# Patient Record
Sex: MALE | Race: WHITE | NOT HISPANIC OR LATINO | Employment: FULL TIME | URBAN - METROPOLITAN AREA
[De-identification: names, ages, dates, MRNs, and addresses within clinical notes are randomized per-mention and may not be internally consistent; named-entity substitution may affect disease eponyms.]

---

## 2017-04-05 ENCOUNTER — TRANSCRIBE ORDERS (OUTPATIENT)
Dept: PHYSICAL THERAPY | Facility: CLINIC | Age: 32
End: 2017-04-05

## 2017-04-05 DIAGNOSIS — Z98.890 H/O LUMBAR DISCECTOMY: Primary | ICD-10-CM

## 2017-04-06 ENCOUNTER — TREATMENT (OUTPATIENT)
Dept: PHYSICAL THERAPY | Facility: CLINIC | Age: 32
End: 2017-04-06

## 2017-04-06 DIAGNOSIS — M54.41 ACUTE RIGHT-SIDED LOW BACK PAIN WITH RIGHT-SIDED SCIATICA: Primary | ICD-10-CM

## 2017-04-06 DIAGNOSIS — Z98.890 H/O LUMBAR DISCECTOMY: ICD-10-CM

## 2017-04-06 PROCEDURE — 97530 THERAPEUTIC ACTIVITIES: CPT | Performed by: PHYSICAL THERAPIST

## 2017-04-06 PROCEDURE — 97001 PR PHYS THERAPY EVALUATION: CPT | Performed by: PHYSICAL THERAPIST

## 2017-04-06 PROCEDURE — 97110 THERAPEUTIC EXERCISES: CPT | Performed by: PHYSICAL THERAPIST

## 2017-04-06 NOTE — PROGRESS NOTES
"Physical Therapy Initial Evaluation and Plan of Care    TIME IN 1501 TIME OUT 1602  Patient: Drew Long   : 1985  Diagnosis/ICD-10 Code:  No primary diagnosis found.  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  for [employer].  Did PT x 1 month without significant improvement.  States he was released back to full duty.   Worked through the pain until 2016 when pain had worsened to unbearable level.   Went to ER - had xray and CT done. Abnormal results.  Referred to spine specialist (Mariah?). MRI ordered.  2 disc herniations with pinched nerve found at L4-5 and L5-S1.  Difficulty negotiating with work comp and getting approval.  Saw Dr. Fields end of 2016 who stated the injury was work-related and surgery was necessary.  Surgery approved.   Delay in PT approval by work comp.   Off work since Dec. 5, 2016.  Denies prior injury or low back pain before work comp injury.  Sleep not interrupted by pain.      Quality of life: excellent    Pain  Current pain ratin  At best pain ratin  At worst pain ratin  Location: Central to right lower lumbar pain, posterolateral hip/thigh pain (approx half-way down); rarely radicular pain to (R) foot  Quality: sharp and dull ache (sharp when it \"catches\")  Relieving factors: medications, heat, relaxation, rest and change in position (occasionally takes a hydrocodone; hot water/bath; lying supine)  Exacerbated by: standing/walking > 1 hour, coming up from bending forward (catch, radicular pain),  getting out of car (catch, pain)  Progression: improved    Social Support  Lives in: one-story house (with basement which he does not use often (father lives in basement))  Lives with: young children and spouse    Hand dominance: right    Diagnostic " "Tests  Abnormal MRI: None since surgery.    Treatments  Previous treatment: physical therapy (for approx. 1 month)  Current treatment: physical therapy  Patient Goals  Patient goals for therapy: decreased pain, increased motion and return to work  Patient goal: \"get to where my back is not as stiff; get ready to get back to work\"           Objective     Tenderness     Additional Tenderness Details  Neg. TTP (B) lumbar and gluteal regions.    Neurological Testing   Sensation     Lumbar   Left   Intact: light touch    Right   Intact: light touch    Additional Neurological Details  Weakness of (R) LE when heel walking (inability to maintain active dorsiflexion with weightbearing on (R) heel only).    Active Range of Motion     Lumbar   Flexion: 25 ((R) LBP) degrees with pain  Extension: 75 degrees   Left lateral flexion: 50 (\"tight, stiff\") degrees   Right lateral flexion: 75 (\"tight, stiff\") degrees   Left rotation: 50 (\"tight, stiff\") degrees   Right rotation: 50 (\"tight, stiff\") degrees     Strength/Myotome Testing     Lumbar     Right   Heel walk: abnormal    Left Hip   Planes of Motion   Flexion: 4+    Right Hip   Planes of Motion   Flexion: 4-    Left Knee   Flexion: 5  Extension: 5    Right Knee   Flexion: 3+  Extension: 4- (with initial sharp pain (R) low back)    Left Ankle/Foot   Dorsiflexion: 5    Right Ankle/Foot   Dorsiflexion: 3-    Additional Strength Details  (R) heel walking positive for significant weakness and inability to maintain active dorsiflexion throughout activity.    Tests     Additional Tests Details  Positive (R) seated SLR.         Assessment & Plan     Assessment  Impairments: abnormal gait, abnormal or restricted ROM, activity intolerance, impaired physical strength, lacks appropriate home exercise program and pain with function  Other impairment: Inability to perform full duty work tasks  Assessment details: STGs: to be met in 4 weeks  1. Patient will be independent with initial HEP  2. " "Patient will report improved \"worst\" s/s to 4-5/10  3. Patient will demonstrate ability to heel walk  4. Patient will demonstrate lumbar AROM WFL all planes, painfree  5. Patient will consistently report no radicular s/s beyond (R) hip region  6. Patient will be able to consistently come to stand from sitting/bending forward/getting out of car without catching and/or radicular pain    LTGs: to be met in 8 weeks  1. Patient will be independent with progressed strength and stabilization HEP  2. Patient will demonstrate improved (R) LE strength to 5-/5  3. Patient will report ability to stand/walk for 2-3 hour periods without pain  4. Patient will report improved s/s to 2/10 worst  5. Patient will demonstrate correct body mechanics independently with lifting activities  6. Patient will demonstrate ability to safely lift, carry, push/pull, crawl, and climb to demonstrate readiness to RTW without restrictions        Goals  \"get to where my back is not as stiff; get ready to get back to work\"    Plan  Plan details: Patient RTMD (Dr. Fields) 05/01/17.        Manual Therapy:         mins  75350;  Therapeutic Exercise:    15     mins  95485;     Neuromuscular Beatrice:        mins  75999;    Therapeutic Activity:     24     mins  23656;     Gait Training:           mins  84262;     Ultrasound:          mins  19393;    Electrical Stimulation:         mins  15201 ( );  Dry Needling          mins self-pay    Timed Treatment:   39   mins   Total Treatment:     61   mins    PT SIGNATURE: Gita Hawkins PT, DPT   DATE TREATMENT INITIATED: 4/6/2017    Initial Certification  Certification Period: 7/5/2017  I certify that the therapy services are furnished while this patient is under my care.  The services outlined above are required by this patient, and will be reviewed every 90 days.     PHYSICIAN: Gordon Fields MD      DATE:     Please sign and return via fax to [phone].. Thank you, [facility] " Therapy.

## 2017-04-12 ENCOUNTER — TREATMENT (OUTPATIENT)
Dept: PHYSICAL THERAPY | Facility: CLINIC | Age: 32
End: 2017-04-12

## 2017-04-12 DIAGNOSIS — M54.41 ACUTE RIGHT-SIDED LOW BACK PAIN WITH RIGHT-SIDED SCIATICA: Primary | ICD-10-CM

## 2017-04-12 DIAGNOSIS — Z98.890 H/O LUMBAR DISCECTOMY: ICD-10-CM

## 2017-04-12 PROCEDURE — 97530 THERAPEUTIC ACTIVITIES: CPT | Performed by: PHYSICAL THERAPIST

## 2017-04-12 PROCEDURE — 97110 THERAPEUTIC EXERCISES: CPT | Performed by: PHYSICAL THERAPIST

## 2017-04-12 NOTE — PROGRESS NOTES
"Physical Therapy Daily Progress Note    Time In 1104  Time Out 1146    Drew Long reports: \"My back is feeling all right.  I'm not having any pain right now.  I'm doing okay with the stretches.\"    Subjective Evaluation    Pain  Current pain ratin           Objective   See Exercise, Manual, and Modality Logs for complete treatment.   *Added FW and RETRO treadmill ambulation.  *Added supine 90/90 hamstring stretch with belt    Assessment & Plan     Assessment  Assessment details: Patient is able to initiate treadmill ambulation (forward and retro) as well as hamstrings stretches without significant difficulty this date, reporting only \"tightness\" with hamstring stretches.  He is cued for upright posture during ambulation on treadmill with fair return demo.  Moderate to significant deep abdominal weakness is evidenced this date during initiation of lumbar stabilization activities (with emphasis on transversus abdominus muscle) by minimal activation as well as quickness to fatigue.  Issued updated HEP printout to facilitate compliance and patient and his wife both have all questions answered to their satisfaction this date.        Progress per Plan of Care         Manual Therapy:         mins  38637;  Therapeutic Exercise:    26     mins  43088;     Neuromuscular Beatrice:        mins  45829;    Therapeutic Activity:     16     mins  52401;     Gait Training:           mins  11212;     Ultrasound:          mins  53657;    Electrical Stimulation:         mins  78051 ( );  Dry Needling          mins self-pay    Timed Treatment:   42   mins   Total Treatment:     42   mins    Gita Hawkins PT, DPT  Physical Therapist  [place] License # [id number]    "

## 2017-04-13 ENCOUNTER — TREATMENT (OUTPATIENT)
Dept: PHYSICAL THERAPY | Facility: CLINIC | Age: 32
End: 2017-04-13

## 2017-04-13 DIAGNOSIS — Z98.890 H/O LUMBAR DISCECTOMY: ICD-10-CM

## 2017-04-13 DIAGNOSIS — M54.41 ACUTE RIGHT-SIDED LOW BACK PAIN WITH RIGHT-SIDED SCIATICA: Primary | ICD-10-CM

## 2017-04-13 PROCEDURE — 97530 THERAPEUTIC ACTIVITIES: CPT | Performed by: PHYSICAL THERAPIST

## 2017-04-13 PROCEDURE — 97110 THERAPEUTIC EXERCISES: CPT | Performed by: PHYSICAL THERAPIST

## 2017-04-18 ENCOUNTER — TREATMENT (OUTPATIENT)
Dept: PHYSICAL THERAPY | Facility: CLINIC | Age: 32
End: 2017-04-18

## 2017-04-18 DIAGNOSIS — Z98.890 H/O LUMBAR DISCECTOMY: ICD-10-CM

## 2017-04-18 DIAGNOSIS — M54.41 ACUTE RIGHT-SIDED LOW BACK PAIN WITH RIGHT-SIDED SCIATICA: Primary | ICD-10-CM

## 2017-04-18 PROCEDURE — 97530 THERAPEUTIC ACTIVITIES: CPT | Performed by: PHYSICAL THERAPIST

## 2017-04-18 PROCEDURE — 97110 THERAPEUTIC EXERCISES: CPT | Performed by: PHYSICAL THERAPIST

## 2017-04-18 NOTE — PROGRESS NOTES
"Physical Therapy Daily Progress Note    Time In 0859  Time Out 0942    Drew Long reports: \"My back feels pretty good today.  I was a little sore after last therapy session but nothing too unmanageable.  My wife and I started walking around the block in our neighborhood and it takes about 20 minutes.  My back is a little sore and tired by the end of that but after I sit down and rest a little bit it feels okay.\"    Subjective     Objective   See Exercise, Manual, and Modality Logs for complete treatment.   *Increased time spent performing retro treadmill  *Added SLR (B)  *Added wall slides with abdominal training    Assessment & Plan     Assessment  Assessment details: Discussed with patient the importance of strength with SLR activity as it is a good overall predictor of LE strength for functional activities and mobility.  He experiences quickness to fatigue (R) LE during closed chain wall slides with abdominal stabilization.  Encouraged patient to work to the point of fatigue but not beyond the point where he is able to utilize each LE equally to perform the activity.  Patient verbalized understanding.        Progress strengthening /stabilization /functional activity - bridging and/or step activity.         Manual Therapy:         mins  18427;  Therapeutic Exercise:    29     mins  45555;     Neuromuscular Beatrice:        mins  71863;    Therapeutic Activity:     14     mins  19556;     Gait Training:           mins  17715;     Ultrasound:          mins  10251;    Electrical Stimulation:         mins  56510 ( );  Dry Needling          mins self-pay    Timed Treatment:   43   mins   Total Treatment:     43   mins    Gita Hawkins PT, DPT  Physical Therapist  [place] License # [id number]    "

## 2017-04-20 ENCOUNTER — TREATMENT (OUTPATIENT)
Dept: PHYSICAL THERAPY | Facility: CLINIC | Age: 32
End: 2017-04-20

## 2017-04-20 DIAGNOSIS — Z98.890 H/O LUMBAR DISCECTOMY: ICD-10-CM

## 2017-04-20 DIAGNOSIS — M54.41 ACUTE RIGHT-SIDED LOW BACK PAIN WITH RIGHT-SIDED SCIATICA: Primary | ICD-10-CM

## 2017-04-20 PROCEDURE — 97530 THERAPEUTIC ACTIVITIES: CPT | Performed by: PHYSICAL THERAPIST

## 2017-04-20 PROCEDURE — 97110 THERAPEUTIC EXERCISES: CPT | Performed by: PHYSICAL THERAPIST

## 2017-04-20 NOTE — PROGRESS NOTES
Physical Therapy Daily Progress Note    Time In 0900  Time Out 0941    Drew Long reports: Patient responds that his back feels pretty good today, things are going well, and he was not excessively sore after last PT session.    Subjective     Objective     Active Range of Motion     Lumbar   Flexion: 35 (%; with tightness) degrees   Extension: 75 (%; some pain) degrees   Left lateral flexion: 75 (%) degrees   Right lateral flexion: 75 (%) degrees   Left rotation: 90 (%) degrees   Right rotation: 90 (%) degrees      See Exercise, Manual, and Modality Logs for complete treatment.   *Increased treadmill forward ambulation time  *Progressed abdominal stabilization activities to incorporate slow, controlled LE marches  *Increased SLR repetitions     Assessment & Plan     Assessment  Assessment details: Patient is able to progress forward treadmill ambulation time, increase SLR repetitions, and progress abdominal stabilization activity to include addition of alternating LE marches (slow, controlled) without c/o increased soreness. He demonstrates mildly improved lumbar flexion, left lateral flexion, and (B) rotation as assessed this date compared to initial evaluation.  Flexion plane is limited by lumbar tightness whereas extension is moderately painful.        Progress strengthening /stabilization /functional activity - attempt to slightly increase treadmill speed.         Manual Therapy:         mins  04451;  Therapeutic Exercise:    27     mins  48306;     Neuromuscular Beatrice:        mins  31887;    Therapeutic Activity:     14     mins  51359;     Gait Training:           mins  38567;     Ultrasound:          mins  22301;    Electrical Stimulation:         mins  72264 ( );  Dry Needling          mins self-pay    Timed Treatment:   41   mins   Total Treatment:     41   mins    Gita Hawkins PT, DPT  Physical Therapist  [place] License # [id number]

## 2017-04-24 ENCOUNTER — TREATMENT (OUTPATIENT)
Dept: PHYSICAL THERAPY | Facility: CLINIC | Age: 32
End: 2017-04-24

## 2017-04-24 DIAGNOSIS — Z98.890 H/O LUMBAR DISCECTOMY: ICD-10-CM

## 2017-04-24 DIAGNOSIS — M54.41 ACUTE RIGHT-SIDED LOW BACK PAIN WITH RIGHT-SIDED SCIATICA: Primary | ICD-10-CM

## 2017-04-24 PROCEDURE — 97530 THERAPEUTIC ACTIVITIES: CPT | Performed by: PHYSICAL THERAPIST

## 2017-04-24 PROCEDURE — 97110 THERAPEUTIC EXERCISES: CPT | Performed by: PHYSICAL THERAPIST

## 2017-04-24 NOTE — PROGRESS NOTES
"Physical Therapy Daily Progress Note    Time In 0859  Time Out 0939    Drew Long reports: \"My back is doing pretty well.  It's sore, but it is manageable. I'm sleeping okay -- my back isn't bothering me. I'm happy with how things are going so far.  I have been walking around the block and toward the end of that I start getting some pain that goes down my right leg.\"    Subjective     Objective   See Exercise, Manual, and Modality Logs for complete treatment.   *Increased retro ambulation time by 1 minute.  *Added bridging with A.H.    Assessment & Plan     Assessment  Assessment details: Patient expresses mild pain in central lower lumbar region with performance of bridging activity.  Cued patient to emphasize abdominal stabilization rather than height achieved with bridging and also to perform exercise within mostly painfree ROM arc and only to the extent that it is performed without radicular s/s.  He does fatigue quickly with abdominal stabilization activities as well as open and closed chain LE strengthening activities, proximal emphasis > distal emphasis.  He demonstrates no (R) foot drop or difficulty clearing (R) LE in 13 minutes of treadmill ambulation (7 forward, 6 retro).        Progress strengthening /stabilization /functional activity. Increase treadmill speed as patient is able.         Manual Therapy:         mins  18675;  Therapeutic Exercise:    27     mins  92096;     Neuromuscular Beatrice:        mins  08897;    Therapeutic Activity:     13     mins  33346;     Gait Training:           mins  05248;     Ultrasound:          mins  36970;    Electrical Stimulation:         mins  79432 ( );  Dry Needling          mins self-pay    Timed Treatment:   40   mins   Total Treatment:     40   mins    Gita Hawkins PT, DPT  Physical Therapist  [place] License # [id number]    "

## 2017-05-01 ENCOUNTER — OFFICE VISIT (OUTPATIENT)
Dept: PHYSICAL THERAPY | Facility: CLINIC | Age: 32
End: 2017-05-01

## 2017-05-01 DIAGNOSIS — Z98.890 H/O LUMBAR DISCECTOMY: Primary | ICD-10-CM

## 2017-05-01 DIAGNOSIS — M54.41 ACUTE RIGHT-SIDED LOW BACK PAIN WITH RIGHT-SIDED SCIATICA: ICD-10-CM

## 2017-05-01 PROCEDURE — 97530 THERAPEUTIC ACTIVITIES: CPT | Performed by: PHYSICAL THERAPIST

## 2017-05-01 PROCEDURE — 97110 THERAPEUTIC EXERCISES: CPT | Performed by: PHYSICAL THERAPIST

## 2017-05-04 ENCOUNTER — OFFICE VISIT (OUTPATIENT)
Dept: PHYSICAL THERAPY | Facility: CLINIC | Age: 32
End: 2017-05-04

## 2017-05-04 DIAGNOSIS — M54.41 ACUTE RIGHT-SIDED LOW BACK PAIN WITH RIGHT-SIDED SCIATICA: Primary | ICD-10-CM

## 2017-05-04 DIAGNOSIS — Z98.890 H/O LUMBAR DISCECTOMY: ICD-10-CM

## 2017-05-04 PROCEDURE — 97530 THERAPEUTIC ACTIVITIES: CPT | Performed by: PHYSICAL THERAPIST

## 2017-05-04 PROCEDURE — 97110 THERAPEUTIC EXERCISES: CPT | Performed by: PHYSICAL THERAPIST

## 2017-05-09 ENCOUNTER — TREATMENT (OUTPATIENT)
Dept: PHYSICAL THERAPY | Facility: CLINIC | Age: 32
End: 2017-05-09

## 2017-05-09 DIAGNOSIS — Z98.890 H/O LUMBAR DISCECTOMY: ICD-10-CM

## 2017-05-09 DIAGNOSIS — M54.41 ACUTE RIGHT-SIDED LOW BACK PAIN WITH RIGHT-SIDED SCIATICA: Primary | ICD-10-CM

## 2017-05-09 PROCEDURE — 97110 THERAPEUTIC EXERCISES: CPT | Performed by: PHYSICAL THERAPIST

## 2017-05-09 PROCEDURE — 97530 THERAPEUTIC ACTIVITIES: CPT | Performed by: PHYSICAL THERAPIST

## 2017-06-13 ENCOUNTER — TREATMENT (OUTPATIENT)
Dept: PHYSICAL THERAPY | Facility: CLINIC | Age: 32
End: 2017-06-13

## 2017-06-13 DIAGNOSIS — Z98.890 H/O LUMBAR DISCECTOMY: ICD-10-CM

## 2017-06-13 DIAGNOSIS — M54.41 ACUTE RIGHT-SIDED LOW BACK PAIN WITH RIGHT-SIDED SCIATICA: Primary | ICD-10-CM

## 2017-06-13 PROCEDURE — 97002 PR PHYS THERAPY RE-EVALUATION: CPT | Performed by: PHYSICAL THERAPIST

## 2017-06-13 PROCEDURE — 97530 THERAPEUTIC ACTIVITIES: CPT | Performed by: PHYSICAL THERAPIST

## 2017-06-13 PROCEDURE — 97110 THERAPEUTIC EXERCISES: CPT | Performed by: PHYSICAL THERAPIST

## 2017-06-13 NOTE — PROGRESS NOTES
"Re-Assessment / Re-Certification    Time In 1100     Time Out 1146    Patient: Drew Long   : 1985  Diagnosis/ICD-10 Code:  Acute right-sided low back pain with right-sided sciatica [M54.41]  Referring practitioner: Gordon Fields MD  Date of Initial Visit: 2017  Today's Date: 2017  Patient seen for 11 sessions s/p lumbar surgery      Subjective:   Drew Long reports: \"My back feels about the same it has been.  Dr. Fields thought I was doing well and he said the nerve pain is probably the nerve healing.  He gave me Gabapentin to take 3 times/day. He wanted me to continue PT. My back is usually a 1-2/10 most days.  I can be up standing/walking for about 1 hour and then my back starts to get painful.  I can rest for a few minutes and then I feel ok to get up and moving again. I'm sleeping normally/without difficulty. I still get some sharp catching in my low back when I stand up too quickly. I have been walking around the block in my neighborhood.\"  Subjective Questionnaire: N/A  Clinical Progress: improved  Home Program Compliance: Yes  Treatment has included: therapeutic exercise, therapeutic activity, gait training and moist heat    Subjective Evaluation    Pain  Current pain ratin  At best pain ratin  At worst pain ratin  Location: some pain in low back, mainly down (R) posterior and lateral LE to toes (wraps anterior at lower leg)  Quality: burning and sharp (also \"sharp, catching\" in low back)         Objective     Tenderness     Additional Tenderness Details  Neg TTP (B) lumbosacral region    Neurological Testing   Sensation     Lumbar   Left   Intact: light touch    Right   Intact: light touch    Active Range of Motion     Lumbar   Flexion: Active lumbar flexion: 25%, increased LBP. with pain  Extension: Active lumbar extension: 75%   Left lateral flexion: Active left lumbar lateral flexion: 75%, \"tight\"   Right lateral flexion: Active right lumbar lateral " "flexion: 75%, \"tight\"   Left rotation: Active left lumbar rotation: 100%, \"feels fine\"   Right rotation: Active right lumbar rotation: 100%, \"feels fine\"     Strength/Myotome Testing     Lumbar   Left   Heel walk: normal  Toe walk: normal    Right   Heel walk: normal  Toe walk: normal    Left Hip   Planes of Motion   Flexion: 5    Right Hip   Planes of Motion   Flexion: 4-    Left Knee   Flexion: 5  Extension: 5    Right Knee   Flexion: 4-  Extension: 4    Left Ankle/Foot   Dorsiflexion: 5    Right Ankle/Foot   Dorsiflexion: 3+    Additional Strength Details  (R) ankle \"feels weak\" with heel/toe walking    Ambulation     Observational Gait   Gait: within functional limits     Additional Observational Gait Details  No discernible antalgia or asymmetry noted during ambulation within PT clinic this date.     Assessment/Plan  Progress toward previous goals: Partially Met    New Goals  STGs: to be met in 2 weeks  1. Patient will be independent with initial HEP - MET  2. Patient will report improved \"worst\" s/s to 4-5/10 - MET  3. Patient will demonstrate ability to heel walk - PARTIALLY MET  4. Patient will demonstrate lumbar AROM WFL all planes, painfree - NOT MET  5. Patient will consistently report no radicular s/s beyond (R) hip region - MET  6. Patient will be able to consistently come to stand from sitting/bending forward/getting out of car without catching and/or radicular pain - PARTIALLY MET    LTGs: to be met in 6 weeks  1. Patient will be independent with progressed strength and stabilization HEP - NOT MET  2. Patient will demonstrate improved (R) LE strength to 5-/5 - NOT MET  3. Patient will report ability to stand/walk for 2-3 hour periods without pain - PARTIALLY MET  4. Patient will report improved s/s to 2/10 worst - NOT MET  5. Patient will demonstrate correct body mechanics independently with lifting activities - NOT MET  6. Patient will demonstrate ability to safely lift, carry, push/pull, crawl, and " climb to demonstrate readiness to RTW without restrictions - NOT MET     Recommendations: Continue as planned  Timeframe: 6 weeks  Prognosis to achieve goals: good    PT Signature: Gita Hawkins PT  [place] License # [id number]      Based upon review of the patient's progress and continued therapy plan, it is my medical opinion that Drew Long should continue physical therapy treatment at [facility]  [address] [place] [address] [phone].    Signature: __________________________________  Gordno Fields MD    Manual Therapy:         mins  14110;  Therapeutic Exercise:    23     mins  55765;     Neuromuscular Beatrice:        mins  54716;    Therapeutic Activity:     16     mins  77574;     Gait Training:           mins  70362;     Ultrasound:          mins  32744;    Electrical Stimulation:         mins  50090 (MC );  Dry Needling          mins self-pay    Timed Treatment:   39   mins   Total Treatment:     46   mins

## 2017-06-20 ENCOUNTER — TREATMENT (OUTPATIENT)
Dept: PHYSICAL THERAPY | Facility: CLINIC | Age: 32
End: 2017-06-20

## 2017-06-20 DIAGNOSIS — Z98.890 H/O LUMBAR DISCECTOMY: ICD-10-CM

## 2017-06-20 DIAGNOSIS — M54.41 ACUTE RIGHT-SIDED LOW BACK PAIN WITH RIGHT-SIDED SCIATICA: Primary | ICD-10-CM

## 2017-06-20 PROCEDURE — 97110 THERAPEUTIC EXERCISES: CPT | Performed by: PHYSICAL THERAPIST

## 2017-06-20 PROCEDURE — 97530 THERAPEUTIC ACTIVITIES: CPT | Performed by: PHYSICAL THERAPIST

## 2017-06-20 NOTE — PROGRESS NOTES
"Physical Therapy Daily Progress Note    Time In 1053  Time Out 1144    Drew Long reports: \"My back is feeling pretty good today.  Last week there was one morning I woke up with my back and leg really hurting me and it lasted a couple of days before it finally went away.\"    Subjective     Objective   See Exercise, Manual, and Modality Logs for complete treatment.       Assessment & Plan     Assessment  Assessment details: Patient exhibits mild signs of cardiovascular and muscular fatigue with activities performed this date, and requires intermittent cueing to optimize exercise techniques.  Moderate weakness persists (R) > (L) is evidenced by decreased ROM arcs during resisted activities, especially notable with increased repetitions.  He performs all activities slowly and apparently deliberately.        Progress strengthening /stabilization /functional activity - initiate quadruped stabilization activity.         Manual Therapy:         mins  79239;  Therapeutic Exercise:    27     mins  75654;     Neuromuscular Beatrice:        mins  89057;    Therapeutic Activity:     15     mins  13091;     Gait Training:           mins  40993;     Ultrasound:          mins  77790;    Electrical Stimulation:         mins  39883 ( );  Dry Needling          mins self-pay    Timed Treatment:   42   mins   Total Treatment:     51   mins    Gita Hawkins PT, DPT  Physical Therapist  [place] License # [id number]    "

## 2017-06-27 ENCOUNTER — TREATMENT (OUTPATIENT)
Dept: PHYSICAL THERAPY | Facility: CLINIC | Age: 32
End: 2017-06-27

## 2017-06-27 DIAGNOSIS — Z98.890 H/O LUMBAR DISCECTOMY: ICD-10-CM

## 2017-06-27 DIAGNOSIS — M54.41 ACUTE RIGHT-SIDED LOW BACK PAIN WITH RIGHT-SIDED SCIATICA: Primary | ICD-10-CM

## 2017-06-27 PROCEDURE — 97530 THERAPEUTIC ACTIVITIES: CPT | Performed by: PHYSICAL THERAPIST

## 2017-06-27 PROCEDURE — 97110 THERAPEUTIC EXERCISES: CPT | Performed by: PHYSICAL THERAPIST

## 2017-06-27 NOTE — PROGRESS NOTES
"Physical Therapy Daily Progress Note    Time In 1052  Time Out 1141    Drew Long reports: \"Doing about the same.\"    Subjective     Objective   See Exercise, Manual, and Modality Logs for complete treatment.   *Added quadruped lumbar stabilization activity with alternating LE extension  *Progressed most LE strengthening repetitions this date    Assessment & Plan     Assessment  Assessment details: Patient demonstrates fair abdominal stabilization of lumbar spine in quadruped position with LE extension, but fatigues quickly, evidenced by increased lumbar ROM as activity progresses.  He is able to progress most exercise repetitions to 25 with reports of increased fatigue but no significant pain.  He ambulates without significant discernible antalgia within PT clinic this date.        Progress strengthening /stabilization /functional activity         Manual Therapy:         mins  71964;  Therapeutic Exercise:    15     mins  98087;     Neuromuscular Beatrice:        mins  27128;    Therapeutic Activity:     16     mins  15942;     Gait Training:           mins  99452;     Ultrasound:          mins  20515;    Electrical Stimulation:         mins  96172 ( );  Dry Needling          mins self-pay    Timed Treatment:   31   mins   Total Treatment:     49   mins    Gita Hawkins PT, DPT  Physical Therapist  [place] License # [id number]  "

## 2017-06-29 ENCOUNTER — TREATMENT (OUTPATIENT)
Dept: PHYSICAL THERAPY | Facility: CLINIC | Age: 32
End: 2017-06-29

## 2017-06-29 DIAGNOSIS — Z98.890 H/O LUMBAR DISCECTOMY: ICD-10-CM

## 2017-06-29 DIAGNOSIS — M54.41 ACUTE RIGHT-SIDED LOW BACK PAIN WITH RIGHT-SIDED SCIATICA: Primary | ICD-10-CM

## 2017-06-29 PROCEDURE — 97530 THERAPEUTIC ACTIVITIES: CPT | Performed by: PHYSICAL THERAPIST

## 2017-06-29 PROCEDURE — 97110 THERAPEUTIC EXERCISES: CPT | Performed by: PHYSICAL THERAPIST

## 2017-06-29 NOTE — PROGRESS NOTES
"Physical Therapy Daily Progress Note    Time In 1029  Time Out 1117    Drew Long reports: \"I'm not doing too bad.  I'm not having any pain this morning. I feel like I am getting stronger.\"    Subjective     Objective   See Exercise, Manual, and Modality Logs for complete treatment.   *Added march walking and sidestepping vs green band    Assessment & Plan     Assessment  Assessment details: Patient experiences muscular fatigue fairly quickly with resisted sidestepping vs green theraband, but performs correctly with moderate cueing to avoid pelvic or LE rotation (in transverse or frontal plane) and demo of PT.  He also appears to experience some difficulty in maintaining sufficient lumbar stabilization during quadruped LE extension which is performed only through partial ROM due to insufficient strength to perform through full ROM arc.  Encouraged patient in compliance with lumbar and hip stretching as component of HEP.        Progress strengthening /stabilization /functional activity. Increase treadmill ambulation speed.         Manual Therapy:         mins  07798;  Therapeutic Exercise:    32     mins  13524;     Neuromuscular Beatrice:        mins  69304;    Therapeutic Activity:     16     mins  06432;     Gait Training:           mins  54611;     Ultrasound:          mins  38897;    Electrical Stimulation:         mins  13936 ( );  Dry Needling          mins self-pay    Timed Treatment:   48   mins   Total Treatment:     48   mins    Gita Hawkins PT, DPT  Physical Therapist  [place] License # [id number]    "

## 2017-07-05 ENCOUNTER — TREATMENT (OUTPATIENT)
Dept: PHYSICAL THERAPY | Facility: CLINIC | Age: 32
End: 2017-07-05

## 2017-07-05 DIAGNOSIS — M54.41 ACUTE RIGHT-SIDED LOW BACK PAIN WITH RIGHT-SIDED SCIATICA: Primary | ICD-10-CM

## 2017-07-05 DIAGNOSIS — Z98.890 H/O LUMBAR DISCECTOMY: ICD-10-CM

## 2017-07-05 PROCEDURE — 97530 THERAPEUTIC ACTIVITIES: CPT | Performed by: PHYSICAL THERAPIST

## 2017-07-05 PROCEDURE — 97110 THERAPEUTIC EXERCISES: CPT | Performed by: PHYSICAL THERAPIST

## 2017-07-05 NOTE — PROGRESS NOTES
"Physical Therapy Daily Progress Note    Time In 1054  Time Out 1137    Drew Long reports: \"My hips and low back were sore for about 2 days after doing therapy last time.  I don't know if it was the new band exercises or what.\"    Subjective     Objective   See Exercise, Manual, and Modality Logs for complete treatment.   *Increased most LE strengthening repetitions to 30    Assessment & Plan     Assessment  Assessment details: Significant core weakness persists, evident in patient's inability to fully and sufficiently stabilize lumbar spine during LE dynamic activities.  He is quick to fatigue with stabilization activities as well.  He demonstrates insufficient hip stabilizer strength for adequate pelvic stability during weightbearing activity, especially in frontal plane.  He requires minimal to at times moderate cueing to avoid compensation/substitution patterns during exercise performance.        Progress strengthening /stabilization /functional activity and increase treadmill ambulation speed.         Manual Therapy:         mins  41073;  Therapeutic Exercise:    17     mins  70775;     Neuromuscular Beatrice:        mins  98077;    Therapeutic Activity:     19     mins  76015;     Gait Training:           mins  35830;     Ultrasound:          mins  20859;    Electrical Stimulation:         mins  58498 ( );  Dry Needling          mins self-pay    Timed Treatment:   36   mins   Total Treatment:     43   mins    Gita Hawkins PT, DPT  Physical Therapist  [place] License # [id number]    "

## 2017-07-07 ENCOUNTER — TREATMENT (OUTPATIENT)
Dept: PHYSICAL THERAPY | Facility: CLINIC | Age: 32
End: 2017-07-07

## 2017-07-07 DIAGNOSIS — Z98.890 H/O LUMBAR DISCECTOMY: ICD-10-CM

## 2017-07-07 DIAGNOSIS — M54.41 ACUTE RIGHT-SIDED LOW BACK PAIN WITH RIGHT-SIDED SCIATICA: Primary | ICD-10-CM

## 2017-07-07 PROCEDURE — 97110 THERAPEUTIC EXERCISES: CPT | Performed by: PHYSICAL THERAPIST

## 2017-07-07 PROCEDURE — 97530 THERAPEUTIC ACTIVITIES: CPT | Performed by: PHYSICAL THERAPIST

## 2017-07-07 NOTE — PROGRESS NOTES
"Physical Therapy Daily Progress Note    Time In 1057  Time Out 1141    Drew Long reports: \"I have had a little pain in my low back the day after therapy the last couple of times.  I think it might be the band exercises.  I am still sore from Wednesday's PT session.  Other than that I am doing okay. 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  It has been going ok.  It will be better to do my PT appointments in the afternoon now though.\"   Subjective     Objective   See Exercise, Manual, and Modality Logs for complete treatment.   *Changed green band back down to red band due to c/o pain in low back the day following treatment    Assessment & Plan     Assessment  Assessment details: Patient reports persistent soreness and some pain in central lower lumbar region after initiation of resisted band walking activities (march walking and sidestepping) two sessions ago and repeat performance last visit.  Today green band was exchanged for red band (lesser resistance) and band placement was altered to be above the knee instead of below the knee to allow for a shorter lever arm.  Cued patient for activated lower abdominals during activity for increased lumbar stabilization.  Patient verbalized understanding.  He reports no pain at time of performance.  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        Other - reassess tolerance to band color change and s/s following treatment session to modify as appropriate. Reassess lumbar AROM.         Manual Therapy:         mins  99726;  Therapeutic Exercise:    28     mins  78412;     Neuromuscular Beatrice:        mins  20046;    Therapeutic Activity:     16     mins  73081;     Gait Training:           mins  01676;     Ultrasound:          mins  68221;    Electrical Stimulation:         mins  45466 ( );  Dry Needling          mins self-pay    Timed Treatment:   44   mins "   Total Treatment:     44   mins    Gita Hawkins PT, DPT  Physical Therapist  [place] License # [id number]

## 2017-07-11 ENCOUNTER — OFFICE VISIT (OUTPATIENT)
Dept: PHYSICAL THERAPY | Facility: CLINIC | Age: 32
End: 2017-07-11

## 2017-07-11 DIAGNOSIS — M54.41 ACUTE RIGHT-SIDED LOW BACK PAIN WITH RIGHT-SIDED SCIATICA: Primary | ICD-10-CM

## 2017-07-11 DIAGNOSIS — Z98.890 H/O LUMBAR DISCECTOMY: ICD-10-CM

## 2017-07-11 PROCEDURE — 97110 THERAPEUTIC EXERCISES: CPT | Performed by: PHYSICAL THERAPIST

## 2017-07-11 PROCEDURE — 97530 THERAPEUTIC ACTIVITIES: CPT | Performed by: PHYSICAL THERAPIST

## 2017-07-11 NOTE — PROGRESS NOTES
Physical Therapy Daily Progress Note    Time In 1500  Time Out 1550    Drew Long reports:  Less frequent and intense LE symptoms/discomfort versus previously.  Still experiencing some central lumbar discomfort and soreness, unsure if related to band exercises.    Subjective     Objective   See Exercise, Manual, and Modality Logs for complete treatment.   Issued black band for ankle exercises.  Added supine hip abd/er with band(red) and added prone over pillow same side arm/leg raise.  Discussed lumbar support while at work for prolonged sitting.        Assessment/Plan  Pt has improved with continuation of rehab efforts to his low back since initial evaluation..  Symptoms are decreasing in overall intensity and duration in right LE, though some centralized lumbar discomfort remains. Pt is noted to till exhibit isolated mm weakness in his hips, gluts, abdominals and extensor musculature with attempts at higher level lumbar stabilization activities.  Should continue to improve with continued PT efforts/intervention and progression should begin to migrate toward functional/simulated work activity training to further assist in unrestricted RTW efforts.    Other  See one more time prior to MD f/u 7/17/17.  Check goals, ROM, mm strength, etc           Manual Therapy:        mins  37708;  Therapeutic Exercise:  32       mins  53812;     Neuromuscular Beatrice:       mins  07847;    Therapeutic Activity:    12    mins  59589;     Gait Training:           mins  99527;     Ultrasound:        mins  87109;    Electrical Stimulation:         mins  45520 ( );      Timed Treatment:   44   mins   Total Treatment:    50    mins    Edson Haque PTA    Physical Therapist Assistant [place] [id number]

## 2017-07-13 ENCOUNTER — OFFICE VISIT (OUTPATIENT)
Dept: PHYSICAL THERAPY | Facility: CLINIC | Age: 32
End: 2017-07-13

## 2017-07-13 DIAGNOSIS — M54.41 ACUTE RIGHT-SIDED LOW BACK PAIN WITH RIGHT-SIDED SCIATICA: Primary | ICD-10-CM

## 2017-07-13 DIAGNOSIS — Z98.890 H/O LUMBAR DISCECTOMY: ICD-10-CM

## 2017-07-13 PROCEDURE — 97110 THERAPEUTIC EXERCISES: CPT | Performed by: PHYSICAL THERAPIST

## 2017-07-13 PROCEDURE — 97530 THERAPEUTIC ACTIVITIES: CPT | Performed by: PHYSICAL THERAPIST

## 2017-07-13 NOTE — PROGRESS NOTES
Physical Therapy Progress Note    Time In 1456  Time Out 1615      7/13/2017  Gordon Fields MD    Re: Drew Long  ________________________________________________________________    Mr. Drew Long, has attended 8 /10 additional PT sessions for his low back.  Treatment has consisted of: prn modalities, exercise instruction and patient education.  Continuation of PT was delayed due to work comp issues.    S: Drew Long states: feels like I am about the same since last seeing MD/continuing PT.  Reports that he has noticed an increase in central lumbar pain over last few weeks and leg pain continues though maybe less intense.  Foot may be a little better but still weak.      Subjective     Objective     Postural Observations    Additional Postural Observation Details  NAD  Moves without noted guarding with position changes/transitional movements.  Ambulates with more normalized gait, though mild antalgia returns RLE post prolonged walking/weightbearing activity.    Palpation   Left   Tenderness of the lumbar paraspinals.     Right Tenderness of the lumbar paraspinals.     Tenderness     Lumbar Spine  Tenderness in the spinous process.     Additional Tenderness Details  Tender centrally L4-L5     Active Range of Motion     Lumbar   Flexion: 33 (percent:limited by central pain complaints) degrees   Extension: 50 (percent) degrees   Left lateral flexion: 33 (percent limited by guarding/discomfort) degrees   Right lateral flexion: 33 (percent; limited by guarding/discomfort) degrees   Left rotation: 50 (percent) degrees   Right rotation: 50 (percent) degrees     Strength/Myotome Testing     Right Hip   Planes of Motion   Flexion: 4  Abduction: 4  Adduction: 4  External rotation: 4  Internal rotation: 4    Right Knee   Flexion: 4  Extension: 4    Right Ankle/Foot   Dorsiflexion: 4-  Plantar flexion: 4  Inversion: 4  Eversion: 4     See Exercise, Manual, and Modality Logs for complete  treatment.   Discussed continued performance of HEP regimen with emphasis on core strengthening in regards to abdominal and lumbar stabilization activities.  Discussed need to make list of questions for MD regarding symptoms, healing process, length of recovery time and if he feels patient will be able to return to previous occupation/functional level activities.    If continued PT indicated, may benefit from work conditioning to include longer duration and inclusion of full body with tasks/activities.      Assessment/Plan  Compliant/cooperative with rehab efforts to low back, though continues to express central LB and LE discomfort/symptoms which limit overall performance, intensity and duration of activity.  Objectively, he still presents with pain limited/self limited active trunk motion and right LE mm strength.  Exhibits good understanding, recall and performance of HEP regimen though fatigues easily with isolated abd/lumbar stabilization.  If continued PT intervention indicated, patient may benefit from progression toward work conditioning regimen to further assist in efforts to RTW/previous functional level activities.    Other  To MD.  Await further orders regarding continued conventional therapy or progression to work conditioning/hardening activities.           Manual Therapy:        mins  32592;  Therapeutic Exercise:    35     mins  61202;     Neuromuscular Beatrice:       mins  09418;    Therapeutic Activity:    20      mins  63429;     Gait Training:           mins  61293;     Ultrasound:          mins  70986;    Electrical Stimulation:         mins  09659 ( );      Timed Treatment:   55  mins   Total Treatment:     79   mins    Edson Haque PTA  Physical Therapist Assistant # [id number]
